# Patient Record
Sex: MALE | Race: ASIAN | URBAN - METROPOLITAN AREA
[De-identification: names, ages, dates, MRNs, and addresses within clinical notes are randomized per-mention and may not be internally consistent; named-entity substitution may affect disease eponyms.]

---

## 2020-01-13 PROBLEM — 710815001: Status: ACTIVE | Noted: 2020-01-13

## 2020-01-13 PROBLEM — 82934008: Status: ACTIVE | Noted: 2020-01-13

## 2020-01-13 PROBLEM — 110483000: Status: ACTIVE | Noted: 2020-01-13

## 2020-01-13 PROBLEM — 313436004: Status: ACTIVE | Noted: 2020-01-13

## 2020-01-13 PROBLEM — 80301007: Status: ACTIVE | Noted: 2020-01-13

## 2020-12-09 ENCOUNTER — OFFICE VISIT (OUTPATIENT)
Dept: URBAN - METROPOLITAN AREA CLINIC 37 | Facility: CLINIC | Age: 75
End: 2020-12-09

## 2023-09-13 PROBLEM — 128302006: Status: ACTIVE | Noted: 2023-09-13

## 2023-09-14 ENCOUNTER — OFFICE VISIT (OUTPATIENT)
Dept: URBAN - METROPOLITAN AREA CLINIC 37 | Facility: CLINIC | Age: 78
End: 2023-09-14

## 2023-09-14 RX ORDER — AMOXICILLIN 500 MG/1
1 CAPSULE CAPSULE ORAL BID
COMMUNITY
Start: 2020-01-08

## 2023-09-14 RX ORDER — CARBOXYMETHYLCELLULOSE SODIUM 5 MG/ML
AS DIRECTED SOLUTION/ DROPS OPHTHALMIC
Status: ACTIVE | COMMUNITY

## 2023-09-14 RX ORDER — SIMETHICONE 80 MG
1 TABLET AFTER MEALS AS NEEDED TABLET,CHEWABLE ORAL THREE TIMES A DAY
Qty: 90 TABLET | Refills: 0 | COMMUNITY
Start: 2020-01-13

## 2023-09-14 RX ORDER — METFORMIN HYDROCHLORIDE 500 MG/1
1 TABLET WITH A MEAL TABLET, FILM COATED ORAL ONCE A DAY
Qty: 30 | Status: ACTIVE | COMMUNITY

## 2023-09-14 RX ORDER — FINASTERIDE 5 MG/1
1 TABLET TABLET, FILM COATED ORAL ONCE A DAY
Qty: 30 | Status: ACTIVE | COMMUNITY

## 2023-09-14 RX ORDER — PLECANATIDE 3 MG/1
1 TABLET TABLET ORAL ONCE A DAY
Qty: 30 | Status: ACTIVE | COMMUNITY

## 2023-09-14 RX ORDER — B-COMPLEX WITH VITAMIN C
AS DIRECTED TABLET ORAL
Status: ACTIVE | COMMUNITY

## 2023-09-14 RX ORDER — DOCUSATE SODIUM 100 MG/1
1 CAPSULE AS NEEDED CAPSULE, GELATIN COATED ORAL ONCE A DAY
Qty: 30 | Status: ACTIVE | COMMUNITY

## 2023-09-14 RX ORDER — DOCUSATE SODIUM 100 MG/1
2 CAPSULES CAPSULE ORAL ONCE A DAY
Qty: 60 CAPSULE | Refills: 5 | COMMUNITY
Start: 2020-01-13

## 2023-09-14 RX ORDER — POLYETHYLENE GLYCOL 3350 17 G/17G
17 GM MIXED IN 10 OZ OF FLUID POWDER, FOR SOLUTION ORAL BID
Qty: 1 BOTTLE | Refills: 2 | COMMUNITY
Start: 2020-01-13

## 2023-09-14 RX ORDER — CIPROFLOXACIN HYDROCHLORIDE 500 MG/1
1 TABLET TABLET, FILM COATED ORAL TWICE A DAY
Status: ACTIVE | COMMUNITY

## 2023-09-14 NOTE — HPI-HEP C
Patient is here for a routine follow up due to history of Hepatitis C, GT 6, G2S2 on liver biopsy 07/10/2021. Last OV was 3 years ago Patient was referred back to me b/c recent labs doen 07/2023 showed elevated AFP
11-Sep-2019 00:19

## 2023-09-19 ENCOUNTER — OUT OF OFFICE VISIT (OUTPATIENT)
Dept: URBAN - METROPOLITAN AREA MEDICAL CENTER 24 | Facility: MEDICAL CENTER | Age: 78
End: 2023-09-19
Payer: COMMERCIAL

## 2023-09-19 DIAGNOSIS — R93.3 ABN FINDINGS-GI TRACT: ICD-10-CM

## 2023-09-19 DIAGNOSIS — R10.84 ABDOMINAL CRAMPING, GENERALIZED: ICD-10-CM

## 2023-09-19 PROCEDURE — 99222 1ST HOSP IP/OBS MODERATE 55: CPT | Performed by: PHYSICIAN ASSISTANT

## 2023-09-19 PROCEDURE — G8427 DOCREV CUR MEDS BY ELIG CLIN: HCPCS | Performed by: PHYSICIAN ASSISTANT

## 2023-09-20 ENCOUNTER — OUT OF OFFICE VISIT (OUTPATIENT)
Dept: URBAN - METROPOLITAN AREA MEDICAL CENTER 24 | Facility: MEDICAL CENTER | Age: 78
End: 2023-09-20
Payer: COMMERCIAL

## 2023-09-20 DIAGNOSIS — R93.3 ABN FINDINGS-GI TRACT: ICD-10-CM

## 2023-09-20 DIAGNOSIS — R10.84 ABDOMINAL CRAMPING, GENERALIZED: ICD-10-CM

## 2023-09-20 PROCEDURE — 99232 SBSQ HOSP IP/OBS MODERATE 35: CPT | Performed by: INTERNAL MEDICINE

## 2023-09-21 ENCOUNTER — OUT OF OFFICE VISIT (OUTPATIENT)
Dept: URBAN - METROPOLITAN AREA MEDICAL CENTER 24 | Facility: MEDICAL CENTER | Age: 78
End: 2023-09-21
Payer: COMMERCIAL

## 2023-09-21 ENCOUNTER — OUT OF OFFICE VISIT (OUTPATIENT)
Dept: URBAN - METROPOLITAN AREA MEDICAL CENTER 24 | Facility: MEDICAL CENTER | Age: 78
End: 2023-09-21

## 2023-09-21 DIAGNOSIS — K29.80 ACUTE DUODENITIS: ICD-10-CM

## 2023-09-21 DIAGNOSIS — K29.40 ATROPHIC GASTRITIS: ICD-10-CM

## 2023-09-21 DIAGNOSIS — D50.9 ANEMIA: ICD-10-CM

## 2023-09-21 DIAGNOSIS — K31.A11 GASTRIC INTESTINAL METAPLASIA WITHOUT DYSPLASIA, INVOLVING THE ANTRUM: ICD-10-CM

## 2023-09-21 PROCEDURE — 43239 EGD BIOPSY SINGLE/MULTIPLE: CPT | Performed by: INTERNAL MEDICINE

## 2023-09-22 ENCOUNTER — OUT OF OFFICE VISIT (OUTPATIENT)
Dept: URBAN - METROPOLITAN AREA MEDICAL CENTER 24 | Facility: MEDICAL CENTER | Age: 78
End: 2023-09-22
Payer: COMMERCIAL

## 2023-09-22 DIAGNOSIS — R93.3 ABN FINDINGS-GI TRACT: ICD-10-CM

## 2023-09-22 DIAGNOSIS — R10.84 ABDOMINAL CRAMPING, GENERALIZED: ICD-10-CM

## 2023-09-22 PROCEDURE — 99232 SBSQ HOSP IP/OBS MODERATE 35: CPT | Performed by: INTERNAL MEDICINE

## 2023-10-13 ENCOUNTER — TELEPHONE ENCOUNTER (OUTPATIENT)
Dept: URBAN - METROPOLITAN AREA CLINIC 35 | Facility: CLINIC | Age: 78
End: 2023-10-13

## 2023-10-15 PROBLEM — 442618008: Status: ACTIVE | Noted: 2023-10-15

## 2023-10-15 PROBLEM — 408574004: Status: ACTIVE | Noted: 2023-10-15

## 2023-10-15 PROBLEM — 428283002: Status: ACTIVE | Noted: 2020-01-13

## 2023-10-19 ENCOUNTER — LAB OUTSIDE AN ENCOUNTER (OUTPATIENT)
Dept: URBAN - METROPOLITAN AREA CLINIC 37 | Facility: CLINIC | Age: 78
End: 2023-10-19

## 2023-10-19 ENCOUNTER — OFFICE VISIT (OUTPATIENT)
Dept: URBAN - METROPOLITAN AREA CLINIC 37 | Facility: CLINIC | Age: 78
End: 2023-10-19
Payer: COMMERCIAL

## 2023-10-19 VITALS
HEIGHT: 64 IN | BODY MASS INDEX: 22.2 KG/M2 | SYSTOLIC BLOOD PRESSURE: 140 MMHG | DIASTOLIC BLOOD PRESSURE: 80 MMHG | WEIGHT: 130 LBS | OXYGEN SATURATION: 100 % | HEART RATE: 84 BPM

## 2023-10-19 DIAGNOSIS — Z86.010 PERSONAL HISTORY OF COLONIC POLYPS: ICD-10-CM

## 2023-10-19 DIAGNOSIS — K59.04 CHRONIC IDIOPATHIC CONSTIPATION: ICD-10-CM

## 2023-10-19 DIAGNOSIS — D62 ANEMIA DUE TO ACUTE BLOOD LOSS: ICD-10-CM

## 2023-10-19 DIAGNOSIS — K26.9 DUODENAL ULCER: ICD-10-CM

## 2023-10-19 DIAGNOSIS — R97.8 ELEVATED TUMOR MARKERS: ICD-10-CM

## 2023-10-19 DIAGNOSIS — R16.0 LIVER MASS: ICD-10-CM

## 2023-10-19 DIAGNOSIS — Z86.19 HISTORY OF HEPATITIS C: ICD-10-CM

## 2023-10-19 DIAGNOSIS — Z72.0 TOBACCO USE: ICD-10-CM

## 2023-10-19 PROBLEM — 267530009: Status: ACTIVE | Noted: 2023-10-19

## 2023-10-19 PROBLEM — 51868009: Status: ACTIVE | Noted: 2023-10-19

## 2023-10-19 PROBLEM — 300332007: Status: ACTIVE | Noted: 2023-10-19

## 2023-10-19 PROBLEM — 93871000119101: Status: ACTIVE | Noted: 2020-01-13

## 2023-10-19 PROCEDURE — 99214 OFFICE O/P EST MOD 30 MIN: CPT | Performed by: INTERNAL MEDICINE

## 2023-10-19 RX ORDER — HYDROCODONE BITARTRATE AND ACETAMINOPHEN 5; 325 MG/1; MG/1
1 TABLET AS NEEDED TABLET ORAL
Status: ACTIVE | COMMUNITY

## 2023-10-19 RX ORDER — B-COMPLEX WITH VITAMIN C
AS DIRECTED TABLET ORAL
Status: ON HOLD | COMMUNITY

## 2023-10-19 RX ORDER — SIMVASTATIN 20 MG
1 TABLET IN THE EVENING TABLET ORAL ONCE A DAY
Status: ACTIVE | COMMUNITY

## 2023-10-19 RX ORDER — PANTOPRAZOLE SODIUM 40 MG/1
1 TABLET TABLET, DELAYED RELEASE ORAL
Qty: 90 | Refills: 1 | OUTPATIENT
Start: 2023-10-19

## 2023-10-19 RX ORDER — FERROUS SULFATE TAB 325 MG (65 MG ELEMENTAL FE) 325 (65 FE) MG
1 TABLET TAB ORAL TWICE DAILY
Qty: 60 | Refills: 5 | OUTPATIENT
Start: 2023-10-19

## 2023-10-19 RX ORDER — DOCUSATE SODIUM 100 MG/1
1 CAPSULE AS NEEDED CAPSULE, GELATIN COATED ORAL ONCE A DAY
Qty: 30 | Status: ACTIVE | COMMUNITY

## 2023-10-19 RX ORDER — LINACLOTIDE 145 UG/1
1 CAPSULE AT LEAST 30 MINUTES BEFORE THE FIRST MEAL OF THE DAY ON AN EMPTY STOMACH CAPSULE, GELATIN COATED ORAL ONCE A DAY
Qty: 90 | Refills: 3 | OUTPATIENT
Start: 2023-10-19 | End: 2024-10-13

## 2023-10-19 RX ORDER — CARBOXYMETHYLCELLULOSE SODIUM 5 MG/ML
AS DIRECTED SOLUTION/ DROPS OPHTHALMIC
Status: ACTIVE | COMMUNITY

## 2023-10-19 RX ORDER — DOCUSATE SODIUM 100 MG/1
2 CAPSULES CAPSULE ORAL ONCE A DAY
Qty: 60 CAPSULE | Refills: 5 | COMMUNITY
Start: 2020-01-13

## 2023-10-19 RX ORDER — METFORMIN HYDROCHLORIDE 500 MG/1
1 TABLET WITH A MEAL TABLET, FILM COATED ORAL ONCE A DAY
Qty: 30 | Status: ACTIVE | COMMUNITY

## 2023-10-19 RX ORDER — PLECANATIDE 3 MG/1
1 TABLET TABLET ORAL ONCE A DAY
Qty: 30 | Status: ON HOLD | COMMUNITY

## 2023-10-19 RX ORDER — POLYETHYLENE GLYCOL 3350 17 G/17G
17 GM MIXED IN 10 OZ OF FLUID POWDER, FOR SOLUTION ORAL BID
Qty: 1 BOTTLE | Refills: 2 | COMMUNITY
Start: 2020-01-13

## 2023-10-19 RX ORDER — SODIUM SULFATE, POTASSIUM SULFATE, MAGNESIUM SULFATE 17.5; 3.13; 1.6 G/ML; G/ML; G/ML
AS DIRECTED SOLUTION, CONCENTRATE ORAL ONCE
Qty: 1 KIT | Refills: 0 | Status: ON HOLD | COMMUNITY
Start: 2020-01-13

## 2023-10-19 RX ORDER — FINASTERIDE 5 MG/1
1 TABLET TABLET, FILM COATED ORAL ONCE A DAY
Qty: 30 | Status: ON HOLD | COMMUNITY

## 2023-10-19 RX ORDER — LINACLOTIDE 145 UG/1
1 CAPSULE AT LEAST 30 MINUTES BEFORE THE FIRST MEAL OF THE DAY ON AN EMPTY STOMACH CAPSULE, GELATIN COATED ORAL ONCE A DAY
Status: ACTIVE | COMMUNITY

## 2023-10-19 RX ORDER — CIPROFLOXACIN HYDROCHLORIDE 500 MG/1
1 TABLET TABLET, FILM COATED ORAL TWICE A DAY
Status: ON HOLD | COMMUNITY

## 2023-10-19 RX ORDER — IBUPROFEN 600 MG/1
1 TABLET WITH FOOD OR MILK AS NEEDED TABLET, FILM COATED ORAL THREE TIMES A DAY
Status: ACTIVE | COMMUNITY

## 2023-10-19 RX ORDER — FERROUS SULFATE TAB 325 MG (65 MG ELEMENTAL FE) 325 (65 FE) MG
1 TABLET TAB ORAL
Status: ACTIVE | COMMUNITY

## 2023-10-19 RX ORDER — SUCRALFATE 1 G/1
1 TABLET ON AN EMPTY STOMACH TABLET ORAL
Qty: 60 | Refills: 1 | OUTPATIENT
Start: 2023-10-19 | End: 2023-12-17

## 2023-10-19 RX ORDER — PANTOPRAZOLE SODIUM 40 MG/1
1 TABLET TABLET, DELAYED RELEASE ORAL ONCE A DAY
Status: ACTIVE | COMMUNITY

## 2023-10-19 RX ORDER — AMOXICILLIN 500 MG/1
1 CAPSULE CAPSULE ORAL BID
Status: ACTIVE | COMMUNITY
Start: 2020-01-08

## 2023-10-19 RX ORDER — AMLODIPINE BESYLATE 2.5 MG/1
1 TABLET TABLET ORAL ONCE A DAY
Status: ACTIVE | COMMUNITY

## 2023-10-19 RX ORDER — SIMETHICONE 80 MG
1 TABLET AFTER MEALS AS NEEDED TABLET,CHEWABLE ORAL THREE TIMES A DAY
Qty: 90 TABLET | Refills: 0 | COMMUNITY
Start: 2020-01-13

## 2023-10-19 NOTE — HPI-ABNORMAL FINDINGS
This is 78 years-old [place] male known to the practice presents today due to abnormal finding on labs and imaging (MRI Abd).  Patient has a history of Hepatitis C, GT6. Patient was completed 48 weeks of PEG/RBV dual therapy ending in 08/2013.  Patient had a liver biopsy on 07/10/2012, which showed chronic active hepatitis with mild activity with grade 2 and stage 2 fibrosis.  Patient has been following up with JACK[facility] Carroll & Dr. Radha Alonso. On 07/07/2023, she had routine labs done which showed AFP: 2507.2 (H) He was referred to me and had an appointment on 09/14/23 but didn't show up for appointment Patient was seen by PCP and had an MRI Abd/pelvis done on 10/12/2023 that revealed a liver mass within the lateral aspect of the caudate measures 2.1 x 1.4 and restricted diffusion. The lesion appears new since 2020.  Enhancement characteristics cannot be assessed due to motion artifact, but malignancy remains a concerns given the presence of restricted diffusion. Small cystic focus in the pancreatic body, potentially sidebranch IPMN.

## 2023-10-19 NOTE — HPI-CONSTIPATION
Patient is here for an acute visit due to constipation Onset of symptoms: several years ago Description: 1 BM every 2-3 days, type 2 stool on BSC Associated symptoms: denies rectal bleeding, blood in stool. Patient reports that he has black stools, but was told by his PCP that it was due to his supplements  Medications tried: Linzess 145 mcg PRN

## 2023-10-20 LAB
A/G RATIO: 1.4
ALBUMIN: 4.2
ALKALINE PHOSPHATASE: 114
ALT (SGPT): 8
AST (SGOT): 17
BILIRUBIN, TOTAL: 0.3
BUN/CREATININE RATIO: (no result)
BUN: 11
CALCIUM: 9.8
CARBON DIOXIDE, TOTAL: 26
CHLORIDE: 104
CREATININE: 0.71
EGFR: 95
GLOBULIN, TOTAL: 3.1
GLUCOSE: 99
HEMATOCRIT: 39.3
HEMOGLOBIN: 12.5
INR: 1
IRON BIND.CAP.(TIBC): 322
IRON SATURATION: 8
IRON: 27
MCH: 29.8
MCHC: 31.8
MCV: 93.8
MPV: 10.1
PLATELET COUNT: 299
POTASSIUM: 4.4
PROTEIN, TOTAL: 7.3
PT: 10.7
RDW: 13
RED BLOOD CELL COUNT: 4.19
SODIUM: 138
WHITE BLOOD CELL COUNT: 5.6

## 2023-11-16 ENCOUNTER — LAB OUTSIDE AN ENCOUNTER (OUTPATIENT)
Dept: URBAN - METROPOLITAN AREA CLINIC 37 | Facility: CLINIC | Age: 78
End: 2023-11-16

## 2023-11-16 ENCOUNTER — OFFICE VISIT (OUTPATIENT)
Dept: URBAN - METROPOLITAN AREA CLINIC 37 | Facility: CLINIC | Age: 78
End: 2023-11-16
Payer: COMMERCIAL

## 2023-11-16 VITALS
OXYGEN SATURATION: 96 % | WEIGHT: 130 LBS | DIASTOLIC BLOOD PRESSURE: 62 MMHG | HEART RATE: 73 BPM | HEIGHT: 64 IN | SYSTOLIC BLOOD PRESSURE: 124 MMHG | BODY MASS INDEX: 22.2 KG/M2

## 2023-11-16 DIAGNOSIS — Z86.010 PERSONAL HISTORY OF COLONIC POLYPS: ICD-10-CM

## 2023-11-16 DIAGNOSIS — Z72.0 TOBACCO USE: ICD-10-CM

## 2023-11-16 DIAGNOSIS — R97.8 ELEVATED TUMOR MARKERS: ICD-10-CM

## 2023-11-16 DIAGNOSIS — D62 ANEMIA DUE TO ACUTE BLOOD LOSS: ICD-10-CM

## 2023-11-16 DIAGNOSIS — K26.9 DUODENAL ULCER: ICD-10-CM

## 2023-11-16 DIAGNOSIS — R16.0 LIVER MASS: ICD-10-CM

## 2023-11-16 DIAGNOSIS — K59.04 CHRONIC IDIOPATHIC CONSTIPATION: ICD-10-CM

## 2023-11-16 DIAGNOSIS — Z86.19 HISTORY OF HEPATITIS C: ICD-10-CM

## 2023-11-16 PROCEDURE — 99215 OFFICE O/P EST HI 40 MIN: CPT | Performed by: INTERNAL MEDICINE

## 2023-11-16 RX ORDER — PLECANATIDE 3 MG/1
1 TABLET TABLET ORAL ONCE A DAY
Qty: 30 | Status: ON HOLD | COMMUNITY

## 2023-11-16 RX ORDER — POLYETHYLENE GLYCOL 3350 17 G/17G
17 GM MIXED IN 10 OZ OF FLUID POWDER, FOR SOLUTION ORAL BID
Qty: 1 BOTTLE | Refills: 2 | Status: ON HOLD | COMMUNITY
Start: 2020-01-13

## 2023-11-16 RX ORDER — SUCRALFATE 1 G/1
1 TABLET ON AN EMPTY STOMACH TABLET ORAL
Qty: 60 | Refills: 1 | Status: ACTIVE | COMMUNITY
Start: 2023-10-19 | End: 2023-12-17

## 2023-11-16 RX ORDER — FERROUS SULFATE TAB 325 MG (65 MG ELEMENTAL FE) 325 (65 FE) MG
1 TABLET TAB ORAL TWICE DAILY
Qty: 60 | Refills: 5 | OUTPATIENT

## 2023-11-16 RX ORDER — DOCUSATE SODIUM 100 MG/1
2 CAPSULES CAPSULE ORAL ONCE A DAY
Qty: 60 CAPSULE | Refills: 5 | Status: ON HOLD | COMMUNITY
Start: 2020-01-13

## 2023-11-16 RX ORDER — METOPROLOL SUCCINATE 25 MG/1
1 TABLET TABLET, FILM COATED, EXTENDED RELEASE ORAL ONCE A DAY
Status: ACTIVE | COMMUNITY

## 2023-11-16 RX ORDER — CARBOXYMETHYLCELLULOSE SODIUM 5 MG/ML
AS DIRECTED SOLUTION/ DROPS OPHTHALMIC
Status: ACTIVE | COMMUNITY

## 2023-11-16 RX ORDER — LACTULOSE 10 G/15ML
15 ML SOLUTION ORAL
Qty: 900 MILLILITER | Refills: 3 | OUTPATIENT
Start: 2023-11-16 | End: 2024-03-15

## 2023-11-16 RX ORDER — AMLODIPINE BESYLATE 2.5 MG/1
1 TABLET TABLET ORAL ONCE A DAY
Status: ACTIVE | COMMUNITY

## 2023-11-16 RX ORDER — PANTOPRAZOLE SODIUM 40 MG/1
1 TABLET TABLET, DELAYED RELEASE ORAL
Qty: 90 | Refills: 1 | OUTPATIENT

## 2023-11-16 RX ORDER — LOSARTAN POTASSIUM 100 MG/1
1 TABLET TABLET ORAL ONCE A DAY
Status: ACTIVE | COMMUNITY

## 2023-11-16 RX ORDER — SIMETHICONE 80 MG
1 TABLET AFTER MEALS AS NEEDED TABLET,CHEWABLE ORAL THREE TIMES A DAY
Qty: 90 TABLET | Refills: 0 | Status: ON HOLD | COMMUNITY
Start: 2020-01-13

## 2023-11-16 RX ORDER — PANTOPRAZOLE SODIUM 40 MG/1
1 TABLET TABLET, DELAYED RELEASE ORAL ONCE A DAY
Status: ACTIVE | COMMUNITY

## 2023-11-16 RX ORDER — FERROUS SULFATE TAB 325 MG (65 MG ELEMENTAL FE) 325 (65 FE) MG
1 TABLET TAB ORAL
Status: ACTIVE | COMMUNITY

## 2023-11-16 RX ORDER — LINACLOTIDE 145 UG/1
1 CAPSULE AT LEAST 30 MINUTES BEFORE THE FIRST MEAL OF THE DAY ON AN EMPTY STOMACH CAPSULE, GELATIN COATED ORAL ONCE A DAY
Qty: 90 | Refills: 3 | OUTPATIENT

## 2023-11-16 RX ORDER — B-COMPLEX WITH VITAMIN C
AS DIRECTED TABLET ORAL
Status: ON HOLD | COMMUNITY

## 2023-11-16 RX ORDER — LINACLOTIDE 145 UG/1
1 CAPSULE AT LEAST 30 MINUTES BEFORE THE FIRST MEAL OF THE DAY ON AN EMPTY STOMACH CAPSULE, GELATIN COATED ORAL ONCE A DAY
Status: ACTIVE | COMMUNITY

## 2023-11-16 RX ORDER — SIMVASTATIN 20 MG
1 TABLET IN THE EVENING TABLET ORAL ONCE A DAY
Status: ACTIVE | COMMUNITY

## 2023-11-16 RX ORDER — METFORMIN HYDROCHLORIDE 500 MG/1
1 TABLET WITH A MEAL TABLET, FILM COATED ORAL ONCE A DAY
Qty: 30 | Status: ACTIVE | COMMUNITY

## 2023-11-16 RX ORDER — SUCRALFATE 1 G/1
1 TABLET ON AN EMPTY STOMACH TABLET ORAL
Qty: 60 | Refills: 1 | OUTPATIENT

## 2023-11-16 RX ORDER — FINASTERIDE 5 MG/1
1 TABLET TABLET, FILM COATED ORAL ONCE A DAY
Qty: 30 | Status: ON HOLD | COMMUNITY

## 2023-11-16 NOTE — HPI-CONSTIPATION
Patient is here for a routine follow up due to constipation Pstient initially experienced symptoms several years ago and reported having 1 BM every 2-3 days, type 2 stool on BSC. Patient is currently taking Linzess 145 mcg PRN, but patient reports no improvement in symptoms Patient reports that he has been using [employer] whenever he is severely constipated Current BM: 1 BM every 3-4 days, type 1 stool on BSC Patient denies any rectal bleeding, blood in stool. Patient reports seeing black stool

## 2023-12-12 ENCOUNTER — TELEPHONE ENCOUNTER (OUTPATIENT)
Dept: URBAN - METROPOLITAN AREA CLINIC 35 | Facility: CLINIC | Age: 78
End: 2023-12-12

## 2023-12-22 ENCOUNTER — OFFICE VISIT (OUTPATIENT)
Dept: URBAN - METROPOLITAN AREA SURGERY CENTER 8 | Facility: SURGERY CENTER | Age: 78
End: 2023-12-22

## 2024-01-03 ENCOUNTER — TELEPHONE ENCOUNTER (OUTPATIENT)
Dept: URBAN - METROPOLITAN AREA CLINIC 37 | Facility: CLINIC | Age: 79
End: 2024-01-03

## 2024-01-11 ENCOUNTER — OFFICE VISIT (OUTPATIENT)
Dept: URBAN - METROPOLITAN AREA CLINIC 37 | Facility: CLINIC | Age: 79
End: 2024-01-11

## 2024-01-17 ENCOUNTER — CLAIMS CREATED FROM THE CLAIM WINDOW (OUTPATIENT)
Dept: URBAN - METROPOLITAN AREA SURGERY CENTER 8 | Facility: SURGERY CENTER | Age: 79
End: 2024-01-17
Payer: COMMERCIAL

## 2024-01-17 ENCOUNTER — CLAIMS CREATED FROM THE CLAIM WINDOW (OUTPATIENT)
Dept: URBAN - METROPOLITAN AREA CLINIC 4 | Facility: CLINIC | Age: 79
End: 2024-01-17
Payer: COMMERCIAL

## 2024-01-17 DIAGNOSIS — K29.60 ADENOPAPILLOMATOSIS GASTRICA: ICD-10-CM

## 2024-01-17 DIAGNOSIS — K25.2 ACUTE GASTRIC ULCER: ICD-10-CM

## 2024-01-17 DIAGNOSIS — K21.9 GASTRO-ESOPHAGEAL REFLUX DISEASE WITHOUT ESOPHAGITIS: ICD-10-CM

## 2024-01-17 DIAGNOSIS — K31.89 ACHYLIA: ICD-10-CM

## 2024-01-17 DIAGNOSIS — R68.89 ERYTHEMATOUS MUCOSA: ICD-10-CM

## 2024-01-17 DIAGNOSIS — K21.9 ACID REFLUX: ICD-10-CM

## 2024-01-17 DIAGNOSIS — K29.80 DUODENITIS: ICD-10-CM

## 2024-01-17 DIAGNOSIS — K29.70 GASTRITIS, UNSPECIFIED, WITHOUT BLEEDING: ICD-10-CM

## 2024-01-17 PROCEDURE — 88342 IMHCHEM/IMCYTCHM 1ST ANTB: CPT | Performed by: PATHOLOGY

## 2024-01-17 PROCEDURE — 88305 TISSUE EXAM BY PATHOLOGIST: CPT | Performed by: PATHOLOGY

## 2024-01-17 PROCEDURE — 43239 EGD BIOPSY SINGLE/MULTIPLE: CPT | Performed by: INTERNAL MEDICINE

## 2024-01-17 PROCEDURE — 88313 SPECIAL STAINS GROUP 2: CPT | Performed by: PATHOLOGY

## 2024-01-17 PROCEDURE — G8907 PT DOC NO EVENTS ON DISCHARG: HCPCS | Performed by: INTERNAL MEDICINE

## 2024-01-17 PROCEDURE — 00731 ANES UPR GI NDSC PX NOS: CPT | Performed by: NURSE ANESTHETIST, CERTIFIED REGISTERED

## 2024-01-17 RX ORDER — PANTOPRAZOLE SODIUM 40 MG/1
1 TABLET TABLET, DELAYED RELEASE ORAL
Qty: 90 | Refills: 1 | Status: ACTIVE | COMMUNITY

## 2024-01-17 RX ORDER — PANTOPRAZOLE SODIUM 40 MG/1
1 TABLET TABLET, DELAYED RELEASE ORAL ONCE A DAY
Status: ACTIVE | COMMUNITY

## 2024-01-17 RX ORDER — DOCUSATE SODIUM 100 MG/1
2 CAPSULES CAPSULE ORAL ONCE A DAY
Qty: 60 CAPSULE | Refills: 5 | Status: ON HOLD | COMMUNITY
Start: 2020-01-13

## 2024-01-17 RX ORDER — POLYETHYLENE GLYCOL 3350 17 G/17G
17 GM MIXED IN 10 OZ OF FLUID POWDER, FOR SOLUTION ORAL BID
Qty: 1 BOTTLE | Refills: 2 | Status: ON HOLD | COMMUNITY
Start: 2020-01-13

## 2024-01-17 RX ORDER — CARBOXYMETHYLCELLULOSE SODIUM 5 MG/ML
AS DIRECTED SOLUTION/ DROPS OPHTHALMIC
Status: ACTIVE | COMMUNITY

## 2024-01-17 RX ORDER — SUCRALFATE 1 G/1
1 TABLET ON AN EMPTY STOMACH TABLET ORAL
Qty: 60 | Refills: 1 | Status: ACTIVE | COMMUNITY

## 2024-01-17 RX ORDER — METFORMIN HYDROCHLORIDE 500 MG/1
1 TABLET WITH A MEAL TABLET, FILM COATED ORAL ONCE A DAY
Qty: 30 | Status: ACTIVE | COMMUNITY

## 2024-01-17 RX ORDER — LACTULOSE 10 G/15ML
15 ML SOLUTION ORAL
Qty: 900 MILLILITER | Refills: 3 | Status: ACTIVE | COMMUNITY
Start: 2023-11-16 | End: 2024-03-15

## 2024-01-17 RX ORDER — LINACLOTIDE 145 UG/1
1 CAPSULE AT LEAST 30 MINUTES BEFORE THE FIRST MEAL OF THE DAY ON AN EMPTY STOMACH CAPSULE, GELATIN COATED ORAL ONCE A DAY
Qty: 90 | Refills: 3 | Status: ACTIVE | COMMUNITY

## 2024-01-17 RX ORDER — AMLODIPINE BESYLATE 2.5 MG/1
1 TABLET TABLET ORAL ONCE A DAY
Status: ACTIVE | COMMUNITY

## 2024-01-17 RX ORDER — METOPROLOL SUCCINATE 25 MG/1
1 TABLET TABLET, FILM COATED, EXTENDED RELEASE ORAL ONCE A DAY
Status: ACTIVE | COMMUNITY

## 2024-01-17 RX ORDER — LINACLOTIDE 145 UG/1
1 CAPSULE AT LEAST 30 MINUTES BEFORE THE FIRST MEAL OF THE DAY ON AN EMPTY STOMACH CAPSULE, GELATIN COATED ORAL ONCE A DAY
Status: ACTIVE | COMMUNITY

## 2024-01-17 RX ORDER — SIMVASTATIN 20 MG
1 TABLET IN THE EVENING TABLET ORAL ONCE A DAY
Status: ACTIVE | COMMUNITY

## 2024-01-17 RX ORDER — PLECANATIDE 3 MG/1
1 TABLET TABLET ORAL ONCE A DAY
Qty: 30 | Status: ON HOLD | COMMUNITY

## 2024-01-17 RX ORDER — FINASTERIDE 5 MG/1
1 TABLET TABLET, FILM COATED ORAL ONCE A DAY
Qty: 30 | Status: ON HOLD | COMMUNITY

## 2024-01-17 RX ORDER — SIMETHICONE 80 MG
1 TABLET AFTER MEALS AS NEEDED TABLET,CHEWABLE ORAL THREE TIMES A DAY
Qty: 90 TABLET | Refills: 0 | Status: ON HOLD | COMMUNITY
Start: 2020-01-13

## 2024-01-17 RX ORDER — B-COMPLEX WITH VITAMIN C
AS DIRECTED TABLET ORAL
Status: ON HOLD | COMMUNITY

## 2024-01-17 RX ORDER — LOSARTAN POTASSIUM 100 MG/1
1 TABLET TABLET ORAL ONCE A DAY
Status: ACTIVE | COMMUNITY

## 2024-01-17 RX ORDER — FERROUS SULFATE TAB 325 MG (65 MG ELEMENTAL FE) 325 (65 FE) MG
1 TABLET TAB ORAL
Status: ACTIVE | COMMUNITY

## 2024-01-17 RX ORDER — FERROUS SULFATE TAB 325 MG (65 MG ELEMENTAL FE) 325 (65 FE) MG
1 TABLET TAB ORAL TWICE DAILY
Qty: 60 | Refills: 5 | Status: ACTIVE | COMMUNITY

## 2024-02-08 ENCOUNTER — OV EP (OUTPATIENT)
Dept: URBAN - METROPOLITAN AREA CLINIC 37 | Facility: CLINIC | Age: 79
End: 2024-02-08
Payer: COMMERCIAL

## 2024-02-08 VITALS
OXYGEN SATURATION: 98 % | SYSTOLIC BLOOD PRESSURE: 144 MMHG | HEART RATE: 94 BPM | DIASTOLIC BLOOD PRESSURE: 72 MMHG | WEIGHT: 131 LBS | HEIGHT: 64 IN | BODY MASS INDEX: 22.36 KG/M2

## 2024-02-08 DIAGNOSIS — R16.0 LIVER MASS: ICD-10-CM

## 2024-02-08 DIAGNOSIS — K59.04 CHRONIC IDIOPATHIC CONSTIPATION: ICD-10-CM

## 2024-02-08 DIAGNOSIS — Z86.010 PERSONAL HISTORY OF COLONIC POLYPS: ICD-10-CM

## 2024-02-08 DIAGNOSIS — D62 ANEMIA DUE TO ACUTE BLOOD LOSS: ICD-10-CM

## 2024-02-08 DIAGNOSIS — K26.9 DUODENAL ULCER: ICD-10-CM

## 2024-02-08 DIAGNOSIS — R97.8 ELEVATED TUMOR MARKERS: ICD-10-CM

## 2024-02-08 DIAGNOSIS — Z72.0 TOBACCO USE: ICD-10-CM

## 2024-02-08 DIAGNOSIS — Z86.19 HISTORY OF HEPATITIS C: ICD-10-CM

## 2024-02-08 PROCEDURE — 99214 OFFICE O/P EST MOD 30 MIN: CPT | Performed by: INTERNAL MEDICINE

## 2024-02-08 RX ORDER — FERROUS SULFATE TAB 325 MG (65 MG ELEMENTAL FE) 325 (65 FE) MG
1 TABLET TAB ORAL
Status: ACTIVE | COMMUNITY

## 2024-02-08 RX ORDER — LINACLOTIDE 145 UG/1
1 CAPSULE AT LEAST 30 MINUTES BEFORE THE FIRST MEAL OF THE DAY ON AN EMPTY STOMACH CAPSULE, GELATIN COATED ORAL ONCE A DAY
Qty: 90 | Refills: 3 | Status: ACTIVE | COMMUNITY

## 2024-02-08 RX ORDER — DOCUSATE SODIUM 100 MG/1
2 CAPSULES CAPSULE ORAL ONCE A DAY
Qty: 60 CAPSULE | Refills: 5 | Status: ACTIVE | COMMUNITY
Start: 2020-01-13

## 2024-02-08 RX ORDER — METOPROLOL SUCCINATE 25 MG/1
1 TABLET TABLET, FILM COATED, EXTENDED RELEASE ORAL ONCE A DAY
Status: ACTIVE | COMMUNITY

## 2024-02-08 RX ORDER — PLECANATIDE 3 MG/1
1 TABLET TABLET ORAL ONCE A DAY
Qty: 30 | Status: ACTIVE | COMMUNITY

## 2024-02-08 RX ORDER — SIMVASTATIN 20 MG
1 TABLET IN THE EVENING TABLET ORAL ONCE A DAY
Status: ACTIVE | COMMUNITY

## 2024-02-08 RX ORDER — LOSARTAN POTASSIUM 100 MG/1
1 TABLET TABLET ORAL ONCE A DAY
Status: ACTIVE | COMMUNITY

## 2024-02-08 RX ORDER — PANTOPRAZOLE SODIUM 40 MG/1
1 TABLET TABLET, DELAYED RELEASE ORAL
Qty: 90 | Refills: 1 | Status: ACTIVE | COMMUNITY

## 2024-02-08 RX ORDER — LINACLOTIDE 145 UG/1
1 CAPSULE AT LEAST 30 MINUTES BEFORE THE FIRST MEAL OF THE DAY ON AN EMPTY STOMACH CAPSULE, GELATIN COATED ORAL ONCE A DAY
Qty: 90 | Refills: 3 | OUTPATIENT

## 2024-02-08 RX ORDER — CARBOXYMETHYLCELLULOSE SODIUM 5 MG/ML
AS DIRECTED SOLUTION/ DROPS OPHTHALMIC
Status: ACTIVE | COMMUNITY

## 2024-02-08 RX ORDER — SUCRALFATE 1 G/1
1 TABLET ON AN EMPTY STOMACH TABLET ORAL
Qty: 60 | Refills: 1 | Status: ACTIVE | COMMUNITY

## 2024-02-08 RX ORDER — LINACLOTIDE 145 UG/1
1 CAPSULE AT LEAST 30 MINUTES BEFORE THE FIRST MEAL OF THE DAY ON AN EMPTY STOMACH CAPSULE, GELATIN COATED ORAL ONCE A DAY
Status: ACTIVE | COMMUNITY

## 2024-02-08 RX ORDER — LACTULOSE 10 G/15ML
15 ML SOLUTION ORAL
Qty: 900 MILLILITER | Refills: 3 | Status: ACTIVE | COMMUNITY
Start: 2023-11-16 | End: 2024-03-15

## 2024-02-08 RX ORDER — FERROUS SULFATE TAB 325 MG (65 MG ELEMENTAL FE) 325 (65 FE) MG
1 TABLET TAB ORAL TWICE DAILY
Qty: 60 | Refills: 5 | Status: ACTIVE | COMMUNITY

## 2024-02-08 RX ORDER — AMLODIPINE BESYLATE 2.5 MG/1
1 TABLET TABLET ORAL ONCE A DAY
Status: ACTIVE | COMMUNITY

## 2024-02-08 RX ORDER — LACTULOSE 10 G/15ML
15 ML SOLUTION ORAL
Qty: 900 MILLILITER | Refills: 3 | OUTPATIENT

## 2024-02-08 RX ORDER — B-COMPLEX WITH VITAMIN C
AS DIRECTED TABLET ORAL
Status: ACTIVE | COMMUNITY

## 2024-02-08 RX ORDER — PANTOPRAZOLE SODIUM 40 MG/1
1 TABLET TABLET, DELAYED RELEASE ORAL
Qty: 90 | Refills: 1 | OUTPATIENT

## 2024-02-08 RX ORDER — POLYETHYLENE GLYCOL 3350 17 G/17G
17 GM MIXED IN 10 OZ OF FLUID POWDER, FOR SOLUTION ORAL BID
Qty: 1 BOTTLE | Refills: 2 | Status: ACTIVE | COMMUNITY
Start: 2020-01-13

## 2024-02-08 RX ORDER — METFORMIN HYDROCHLORIDE 500 MG/1
1 TABLET WITH A MEAL TABLET, FILM COATED ORAL ONCE A DAY
Qty: 30 | Status: ACTIVE | COMMUNITY

## 2024-02-08 RX ORDER — FERROUS SULFATE TAB 325 MG (65 MG ELEMENTAL FE) 325 (65 FE) MG
1 TABLET TAB ORAL TWICE DAILY
Qty: 60 | Refills: 5 | OUTPATIENT

## 2024-02-08 RX ORDER — PANTOPRAZOLE SODIUM 40 MG/1
1 TABLET TABLET, DELAYED RELEASE ORAL ONCE A DAY
Status: ACTIVE | COMMUNITY

## 2024-02-08 RX ORDER — FINASTERIDE 5 MG/1
1 TABLET TABLET, FILM COATED ORAL ONCE A DAY
Qty: 30 | Status: ACTIVE | COMMUNITY

## 2024-02-08 RX ORDER — SIMETHICONE 80 MG
1 TABLET AFTER MEALS AS NEEDED TABLET,CHEWABLE ORAL THREE TIMES A DAY
Qty: 90 TABLET | Refills: 0 | Status: ACTIVE | COMMUNITY
Start: 2020-01-13

## 2024-02-08 RX ORDER — SUCRALFATE 1 G/1
1 TABLET ON AN EMPTY STOMACH TABLET ORAL
Qty: 60 | Refills: 1 | OUTPATIENT

## 2024-02-08 NOTE — HPI-CONSTIPATION
Patient is here for a routine follow up due to constipation Last OV was 2 months ago Pstient initially experienced symptoms several years ago and reported having 1 BM every 2-3 days, type 2 stool on BSC. Therefore was prescribed Linzess 145 mcg PRN, but patient reports no improvement in symptoms Advised to add Lactulose 15 mL BID, but ran out of medication a while ago Current BM: 1 BM every 2 day, type 1 stool on BSC Patient denies any rectal bleeding, blood in stool or melena

## 2024-04-01 ENCOUNTER — LAB (OUTPATIENT)
Dept: URBAN - METROPOLITAN AREA CLINIC 37 | Facility: CLINIC | Age: 79
End: 2024-04-01

## 2024-04-11 ENCOUNTER — OV EP (OUTPATIENT)
Dept: URBAN - METROPOLITAN AREA CLINIC 37 | Facility: CLINIC | Age: 79
End: 2024-04-11

## 2025-03-04 ENCOUNTER — CLAIMS CREATED FROM THE CLAIM WINDOW (OUTPATIENT)
Dept: URBAN - METROPOLITAN AREA MEDICAL CENTER 24 | Facility: MEDICAL CENTER | Age: 80
End: 2025-03-04
Payer: COMMERCIAL

## 2025-03-04 DIAGNOSIS — K92.0 BLOODY EMESIS: ICD-10-CM

## 2025-03-04 DIAGNOSIS — Z79.01 ADEQUATE ANTICOAGULATION ON ANTICOAGULANT THERAPY: ICD-10-CM

## 2025-03-04 DIAGNOSIS — D62 ABLA (ACUTE BLOOD LOSS ANEMIA): ICD-10-CM

## 2025-03-04 DIAGNOSIS — R14.0 ABDOMINAL BLOATING: ICD-10-CM

## 2025-03-04 PROCEDURE — 99222 1ST HOSP IP/OBS MODERATE 55: CPT | Performed by: PHYSICIAN ASSISTANT

## 2025-03-04 PROCEDURE — G8427 DOCREV CUR MEDS BY ELIG CLIN: HCPCS | Performed by: PHYSICIAN ASSISTANT

## 2025-03-04 PROCEDURE — 99254 IP/OBS CNSLTJ NEW/EST MOD 60: CPT | Performed by: PHYSICIAN ASSISTANT

## 2025-03-05 ENCOUNTER — CLAIMS CREATED FROM THE CLAIM WINDOW (OUTPATIENT)
Dept: URBAN - METROPOLITAN AREA MEDICAL CENTER 24 | Facility: MEDICAL CENTER | Age: 80
End: 2025-03-05
Payer: COMMERCIAL

## 2025-03-05 DIAGNOSIS — K31.89 OTHER DISEASES OF STOMACH AND DUODENUM: ICD-10-CM

## 2025-03-05 DIAGNOSIS — R10.13 ABDOMINAL DISCOMFORT, EPIGASTRIC: ICD-10-CM

## 2025-03-05 DIAGNOSIS — K26.9 CHILDHOOD DUODENAL ULCER: ICD-10-CM

## 2025-03-05 PROCEDURE — 43235 EGD DIAGNOSTIC BRUSH WASH: CPT | Performed by: INTERNAL MEDICINE

## 2025-03-05 PROCEDURE — 43239 EGD BIOPSY SINGLE/MULTIPLE: CPT | Performed by: INTERNAL MEDICINE

## 2025-03-06 ENCOUNTER — CLAIMS CREATED FROM THE CLAIM WINDOW (OUTPATIENT)
Dept: URBAN - METROPOLITAN AREA MEDICAL CENTER 24 | Facility: MEDICAL CENTER | Age: 80
End: 2025-03-06
Payer: COMMERCIAL

## 2025-03-06 DIAGNOSIS — K92.0 BLOODY EMESIS: ICD-10-CM

## 2025-03-06 DIAGNOSIS — Z79.01 ADEQUATE ANTICOAGULATION ON ANTICOAGULANT THERAPY: ICD-10-CM

## 2025-03-06 DIAGNOSIS — K26.9 CHILDHOOD DUODENAL ULCER: ICD-10-CM

## 2025-03-06 DIAGNOSIS — D62 ABLA (ACUTE BLOOD LOSS ANEMIA): ICD-10-CM

## 2025-03-06 PROCEDURE — 99232 SBSQ HOSP IP/OBS MODERATE 35: CPT | Performed by: PHYSICIAN ASSISTANT

## 2025-03-13 ENCOUNTER — LAB OUTSIDE AN ENCOUNTER (OUTPATIENT)
Dept: URBAN - METROPOLITAN AREA CLINIC 37 | Facility: CLINIC | Age: 80
End: 2025-03-13

## 2025-03-13 ENCOUNTER — DASHBOARD ENCOUNTERS (OUTPATIENT)
Age: 80
End: 2025-03-13

## 2025-03-13 ENCOUNTER — OFFICE VISIT (OUTPATIENT)
Dept: URBAN - METROPOLITAN AREA CLINIC 37 | Facility: CLINIC | Age: 80
End: 2025-03-13
Payer: COMMERCIAL

## 2025-03-13 VITALS
SYSTOLIC BLOOD PRESSURE: 136 MMHG | HEART RATE: 83 BPM | HEIGHT: 64 IN | BODY MASS INDEX: 25.78 KG/M2 | DIASTOLIC BLOOD PRESSURE: 84 MMHG | WEIGHT: 151 LBS | OXYGEN SATURATION: 97 %

## 2025-03-13 DIAGNOSIS — C22.0 HEPATOCELLULAR CARCINOMA: ICD-10-CM

## 2025-03-13 DIAGNOSIS — K26.9 DUODENAL ULCER: ICD-10-CM

## 2025-03-13 DIAGNOSIS — Z72.0 TOBACCO USE: ICD-10-CM

## 2025-03-13 DIAGNOSIS — R97.8 ELEVATED TUMOR MARKERS: ICD-10-CM

## 2025-03-13 DIAGNOSIS — Z86.0101 PERSONAL HISTORY OF ADENOMATOUS AND SERRATED COLON POLYPS: ICD-10-CM

## 2025-03-13 DIAGNOSIS — K59.04 CHRONIC IDIOPATHIC CONSTIPATION: ICD-10-CM

## 2025-03-13 DIAGNOSIS — D62 ANEMIA DUE TO ACUTE BLOOD LOSS: ICD-10-CM

## 2025-03-13 DIAGNOSIS — Z86.19 HISTORY OF HEPATITIS C: ICD-10-CM

## 2025-03-13 PROBLEM — 109841003: Status: ACTIVE | Noted: 2025-03-13

## 2025-03-13 PROBLEM — 428283002: Status: ACTIVE | Noted: 2025-03-13

## 2025-03-13 PROCEDURE — 99214 OFFICE O/P EST MOD 30 MIN: CPT | Performed by: INTERNAL MEDICINE

## 2025-03-13 RX ORDER — FERROUS SULFATE 142(45)MG
1 TABLET TABLET, EXTENDED RELEASE ORAL DAILY
Qty: 30 TABLET | Refills: 3 | OUTPATIENT
Start: 2025-03-13

## 2025-03-13 RX ORDER — PANTOPRAZOLE SODIUM 40 MG/1
1 TABLET TABLET, DELAYED RELEASE ORAL
Qty: 90 | Refills: 1 | Status: ACTIVE | COMMUNITY

## 2025-03-13 RX ORDER — TRAMADOL HYDROCHLORIDE 50 MG/1
1 TABLET AS NEEDED TABLET, FILM COATED ORAL ONCE A DAY
Status: ACTIVE | COMMUNITY

## 2025-03-13 RX ORDER — LINACLOTIDE 145 UG/1
1 CAPSULE AT LEAST 30 MINUTES BEFORE THE FIRST MEAL OF THE DAY ON AN EMPTY STOMACH CAPSULE, GELATIN COATED ORAL ONCE A DAY
Status: ON HOLD | COMMUNITY

## 2025-03-13 RX ORDER — PRAVASTATIN SODIUM 40 MG/1
1 TABLET TABLET ORAL ONCE A DAY
Status: ACTIVE | COMMUNITY

## 2025-03-13 RX ORDER — SIMVASTATIN 20 MG
1 TABLET IN THE EVENING TABLET ORAL ONCE A DAY
Status: ON HOLD | COMMUNITY

## 2025-03-13 RX ORDER — SUCRALFATE 1 G/1
1 TABLET ON AN EMPTY STOMACH TABLET ORAL
Qty: 60 | Refills: 1 | Status: ACTIVE | COMMUNITY

## 2025-03-13 RX ORDER — LOSARTAN POTASSIUM 100 MG/1
1 TABLET TABLET ORAL ONCE A DAY
Status: ON HOLD | COMMUNITY

## 2025-03-13 RX ORDER — APIXABAN 5 MG/1
AS DIRECTED TABLET, FILM COATED ORAL
Status: ACTIVE | COMMUNITY

## 2025-03-13 RX ORDER — OMEPRAZOLE 40 MG/1
1 CAPSULE 1/2 TO 1 HOUR BEFORE MORNING MEAL CAPSULE, DELAYED RELEASE ORAL ONCE A DAY
Status: ON HOLD | COMMUNITY

## 2025-03-13 RX ORDER — PANTOPRAZOLE SODIUM 40 MG/1
1 TABLET BEFORE BREAKFAST AND DINNER TABLET, DELAYED RELEASE ORAL
Qty: 180 | Refills: 2 | OUTPATIENT

## 2025-03-13 RX ORDER — B-COMPLEX WITH VITAMIN C
AS DIRECTED TABLET ORAL
Status: ON HOLD | COMMUNITY

## 2025-03-13 RX ORDER — PANTOPRAZOLE SODIUM 40 MG/1
1 TABLET TABLET, DELAYED RELEASE ORAL ONCE A DAY
Status: ON HOLD | COMMUNITY

## 2025-03-13 RX ORDER — SODIUM, POTASSIUM,MAG SULFATES 17.5-3.13G
177 ML SOLUTION, RECONSTITUTED, ORAL ORAL
Qty: 354 MILLILITER | Refills: 0 | OUTPATIENT
Start: 2025-03-13 | End: 2025-03-15

## 2025-03-13 RX ORDER — POLYETHYLENE GLYCOL 3350 17 G/17G
17 GM MIXED IN 10 OZ OF FLUID POWDER, FOR SOLUTION ORAL BID
Qty: 1 BOTTLE | Refills: 2 | Status: ON HOLD | COMMUNITY
Start: 2020-01-13

## 2025-03-13 RX ORDER — HYDROXYZINE HYDROCHLORIDE 25 MG/1
1 TABLET AS NEEDED TABLET, FILM COATED ORAL ONCE A DAY
Status: ACTIVE | COMMUNITY

## 2025-03-13 RX ORDER — METOPROLOL SUCCINATE 25 MG/1
1 TABLET TABLET, FILM COATED, EXTENDED RELEASE ORAL ONCE A DAY
Status: ACTIVE | COMMUNITY

## 2025-03-13 RX ORDER — FERROUS SULFATE TAB 325 MG (65 MG ELEMENTAL FE) 325 (65 FE) MG
1 TABLET TAB ORAL TWICE DAILY
Qty: 60 | Refills: 5 | Status: ON HOLD | COMMUNITY

## 2025-03-13 RX ORDER — METFORMIN HYDROCHLORIDE 500 MG/1
1 TABLET WITH A MEAL TABLET, FILM COATED ORAL ONCE A DAY
Qty: 30 | Status: ON HOLD | COMMUNITY

## 2025-03-13 RX ORDER — LACTULOSE 10 G/15ML
15 ML SOLUTION ORAL
Qty: 900 MILLILITER | Refills: 3 | Status: ON HOLD | COMMUNITY

## 2025-03-13 RX ORDER — SIMETHICONE 80 MG
1 TABLET AFTER MEALS AS NEEDED TABLET,CHEWABLE ORAL THREE TIMES A DAY
Qty: 90 TABLET | Refills: 0 | Status: ON HOLD | COMMUNITY
Start: 2020-01-13

## 2025-03-13 RX ORDER — FERROUS SULFATE TAB 325 MG (65 MG ELEMENTAL FE) 325 (65 FE) MG
1 TABLET TAB ORAL
Status: ON HOLD | COMMUNITY

## 2025-03-13 RX ORDER — SUCRALFATE 1 G/1
1 TABLET ON AN EMPTY STOMACH TABLET ORAL
Qty: 60 | Refills: 2 | OUTPATIENT

## 2025-03-13 RX ORDER — LINACLOTIDE 145 UG/1
1 CAPSULE AT LEAST 30 MINUTES BEFORE THE FIRST MEAL OF THE DAY ON AN EMPTY STOMACH CAPSULE, GELATIN COATED ORAL ONCE A DAY
Qty: 90 | Refills: 3 | Status: ON HOLD | COMMUNITY

## 2025-03-13 RX ORDER — LINACLOTIDE 145 UG/1
1 CAPSULE AT LEAST 30 MINUTES BEFORE THE FIRST MEAL OF THE DAY ON AN EMPTY STOMACH CAPSULE, GELATIN COATED ORAL ONCE A DAY
Qty: 90 | Refills: 3 | OUTPATIENT

## 2025-03-13 RX ORDER — DOCUSATE SODIUM 100 MG/1
2 CAPSULES CAPSULE ORAL ONCE A DAY
Qty: 60 CAPSULE | Refills: 5 | Status: ON HOLD | COMMUNITY
Start: 2020-01-13

## 2025-03-13 RX ORDER — FINASTERIDE 5 MG/1
1 TABLET TABLET, FILM COATED ORAL ONCE A DAY
Qty: 30 | Status: ACTIVE | COMMUNITY

## 2025-03-13 RX ORDER — PLECANATIDE 3 MG/1
1 TABLET TABLET ORAL ONCE A DAY
Qty: 30 | Status: ON HOLD | COMMUNITY

## 2025-03-13 RX ORDER — CARBOXYMETHYLCELLULOSE SODIUM 5 MG/ML
AS DIRECTED SOLUTION/ DROPS OPHTHALMIC
Status: ON HOLD | COMMUNITY

## 2025-03-13 RX ORDER — AMLODIPINE BESYLATE 2.5 MG/1
1 TABLET TABLET ORAL ONCE A DAY
Status: ON HOLD | COMMUNITY

## 2025-03-13 NOTE — HPI-CONSTIPATION
Patient is here for a routine follow up due to constipation Last OV was 1 year ago Pstient initially experienced symptoms several years ago and reported having 1 BM every 2-3 days, type 2 stool on BSC. Therefore was prescribed Linzess 145 mcg PRN, but patient reports no improvement in symptoms Advised to add Lactulose 15 mL BID, but patient  Current BM: 1 BM every 2-3 days, type 2 stool on BSC Current symptoms: Patient reports having excessive gas and abdomial bloating  Patient denies any rectal bleeding, blood in stool or melena

## 2025-05-13 ENCOUNTER — LAB OUTSIDE AN ENCOUNTER (OUTPATIENT)
Dept: URBAN - METROPOLITAN AREA CLINIC 37 | Facility: CLINIC | Age: 80
End: 2025-05-13

## 2025-05-29 ENCOUNTER — CLAIMS CREATED FROM THE CLAIM WINDOW (OUTPATIENT)
Dept: URBAN - METROPOLITAN AREA CLINIC 4 | Facility: CLINIC | Age: 80
End: 2025-05-29
Payer: COMMERCIAL

## 2025-05-29 ENCOUNTER — TELEPHONE ENCOUNTER (OUTPATIENT)
Dept: URBAN - METROPOLITAN AREA CLINIC 35 | Facility: CLINIC | Age: 80
End: 2025-05-29

## 2025-05-29 ENCOUNTER — OFFICE VISIT (OUTPATIENT)
Dept: URBAN - METROPOLITAN AREA SURGERY CENTER 8 | Facility: SURGERY CENTER | Age: 80
End: 2025-05-29

## 2025-05-29 DIAGNOSIS — K63.5 POLYP OF COLON: ICD-10-CM

## 2025-05-29 DIAGNOSIS — D12.0 BENIGN NEOPLASM OF CECUM: ICD-10-CM

## 2025-05-29 DIAGNOSIS — K63.89 OTHER SPECIFIED DISEASES OF INTESTINE: ICD-10-CM

## 2025-05-29 DIAGNOSIS — K31.89 OTHER DISEASES OF STOMACH AND DUODENUM: ICD-10-CM

## 2025-05-29 PROCEDURE — 88305 TISSUE EXAM BY PATHOLOGIST: CPT | Performed by: PATHOLOGY

## 2025-05-29 PROCEDURE — 88312 SPECIAL STAINS GROUP 1: CPT | Performed by: PATHOLOGY

## 2025-05-29 RX ORDER — LINACLOTIDE 145 UG/1
1 CAPSULE AT LEAST 30 MINUTES BEFORE THE FIRST MEAL OF THE DAY ON AN EMPTY STOMACH CAPSULE, GELATIN COATED ORAL ONCE A DAY
Status: ON HOLD | COMMUNITY

## 2025-05-29 RX ORDER — PANTOPRAZOLE SODIUM 40 MG/1
1 TABLET BEFORE BREAKFAST AND DINNER TABLET, DELAYED RELEASE ORAL
Qty: 180 | Refills: 2 | Status: ACTIVE | COMMUNITY

## 2025-05-29 RX ORDER — TRAMADOL HYDROCHLORIDE 50 MG/1
1 TABLET AS NEEDED TABLET, FILM COATED ORAL ONCE A DAY
Status: ACTIVE | COMMUNITY

## 2025-05-29 RX ORDER — OMEPRAZOLE 40 MG/1
1 CAPSULE 1/2 TO 1 HOUR BEFORE MORNING MEAL CAPSULE, DELAYED RELEASE ORAL ONCE A DAY
Status: ON HOLD | COMMUNITY

## 2025-05-29 RX ORDER — PLECANATIDE 3 MG/1
1 TABLET TABLET ORAL ONCE A DAY
Qty: 30 | Status: ON HOLD | COMMUNITY

## 2025-05-29 RX ORDER — LOSARTAN POTASSIUM 100 MG/1
1 TABLET TABLET ORAL ONCE A DAY
Status: ON HOLD | COMMUNITY

## 2025-05-29 RX ORDER — METFORMIN HYDROCHLORIDE 500 MG/1
1 TABLET WITH A MEAL TABLET, FILM COATED ORAL ONCE A DAY
Qty: 30 | Status: ON HOLD | COMMUNITY

## 2025-05-29 RX ORDER — PANTOPRAZOLE SODIUM 40 MG/1
1 TABLET TABLET, DELAYED RELEASE ORAL ONCE A DAY
Status: ON HOLD | COMMUNITY

## 2025-05-29 RX ORDER — LACTULOSE 10 G/15ML
15 ML SOLUTION ORAL
Qty: 900 MILLILITER | Refills: 3 | Status: ON HOLD | COMMUNITY

## 2025-05-29 RX ORDER — APIXABAN 5 MG/1
AS DIRECTED TABLET, FILM COATED ORAL
Status: ACTIVE | COMMUNITY

## 2025-05-29 RX ORDER — PRAVASTATIN SODIUM 40 MG/1
1 TABLET TABLET ORAL ONCE A DAY
Status: ACTIVE | COMMUNITY

## 2025-05-29 RX ORDER — POLYETHYLENE GLYCOL 3350 17 G/17G
17 GM MIXED IN 10 OZ OF FLUID POWDER, FOR SOLUTION ORAL BID
Qty: 1 BOTTLE | Refills: 2 | Status: ON HOLD | COMMUNITY
Start: 2020-01-13

## 2025-05-29 RX ORDER — FERROUS SULFATE TAB 325 MG (65 MG ELEMENTAL FE) 325 (65 FE) MG
1 TABLET TAB ORAL
Status: ON HOLD | COMMUNITY

## 2025-05-29 RX ORDER — AMLODIPINE BESYLATE 2.5 MG/1
1 TABLET TABLET ORAL ONCE A DAY
Status: ON HOLD | COMMUNITY

## 2025-05-29 RX ORDER — FERROUS SULFATE 142(45)MG
1 TABLET TABLET, EXTENDED RELEASE ORAL DAILY
Qty: 30 TABLET | Refills: 3 | Status: ACTIVE | COMMUNITY
Start: 2025-03-13

## 2025-05-29 RX ORDER — B-COMPLEX WITH VITAMIN C
AS DIRECTED TABLET ORAL
Status: ON HOLD | COMMUNITY

## 2025-05-29 RX ORDER — FINASTERIDE 5 MG/1
1 TABLET TABLET, FILM COATED ORAL ONCE A DAY
Qty: 30 | Status: ACTIVE | COMMUNITY

## 2025-05-29 RX ORDER — SIMETHICONE 80 MG
1 TABLET AFTER MEALS AS NEEDED TABLET,CHEWABLE ORAL THREE TIMES A DAY
Qty: 90 TABLET | Refills: 0 | Status: ON HOLD | COMMUNITY
Start: 2020-01-13

## 2025-05-29 RX ORDER — LINACLOTIDE 145 UG/1
1 CAPSULE AT LEAST 30 MINUTES BEFORE THE FIRST MEAL OF THE DAY ON AN EMPTY STOMACH CAPSULE, GELATIN COATED ORAL ONCE A DAY
Qty: 90 | Refills: 3 | Status: ACTIVE | COMMUNITY

## 2025-05-29 RX ORDER — DOCUSATE SODIUM 100 MG/1
2 CAPSULES CAPSULE ORAL ONCE A DAY
Qty: 60 CAPSULE | Refills: 5 | Status: ON HOLD | COMMUNITY
Start: 2020-01-13

## 2025-05-29 RX ORDER — HYDROXYZINE HYDROCHLORIDE 25 MG/1
1 TABLET AS NEEDED TABLET, FILM COATED ORAL ONCE A DAY
Status: ACTIVE | COMMUNITY

## 2025-05-29 RX ORDER — FERROUS SULFATE TAB 325 MG (65 MG ELEMENTAL FE) 325 (65 FE) MG
1 TABLET TAB ORAL TWICE DAILY
Qty: 60 | Refills: 5 | Status: ON HOLD | COMMUNITY

## 2025-05-29 RX ORDER — SUCRALFATE 1 G/1
1 TABLET ON AN EMPTY STOMACH TABLET ORAL
Qty: 60 | Refills: 2

## 2025-05-29 RX ORDER — METOPROLOL SUCCINATE 25 MG/1
1 TABLET TABLET, FILM COATED, EXTENDED RELEASE ORAL ONCE A DAY
Status: ACTIVE | COMMUNITY

## 2025-05-29 RX ORDER — SIMVASTATIN 20 MG
1 TABLET IN THE EVENING TABLET ORAL ONCE A DAY
Status: ON HOLD | COMMUNITY

## 2025-05-29 RX ORDER — SUCRALFATE 1 G/1
1 TABLET ON AN EMPTY STOMACH TABLET ORAL
Qty: 60 | Refills: 2 | Status: ACTIVE | COMMUNITY

## 2025-05-29 RX ORDER — CARBOXYMETHYLCELLULOSE SODIUM 5 MG/ML
AS DIRECTED SOLUTION/ DROPS OPHTHALMIC
Status: ON HOLD | COMMUNITY

## 2025-05-30 ENCOUNTER — OFFICE VISIT (OUTPATIENT)
Dept: URBAN - METROPOLITAN AREA SURGERY CENTER 8 | Facility: SURGERY CENTER | Age: 80
End: 2025-05-30

## 2025-06-19 ENCOUNTER — OFFICE VISIT (OUTPATIENT)
Dept: URBAN - METROPOLITAN AREA CLINIC 37 | Facility: CLINIC | Age: 80
End: 2025-06-19

## 2025-07-24 ENCOUNTER — OFFICE VISIT (OUTPATIENT)
Dept: URBAN - METROPOLITAN AREA CLINIC 37 | Facility: CLINIC | Age: 80
End: 2025-07-24
Payer: COMMERCIAL

## 2025-07-24 DIAGNOSIS — C22.0 HEPATOCELLULAR CARCINOMA: ICD-10-CM

## 2025-07-24 DIAGNOSIS — K26.9 DUODENAL ULCER: ICD-10-CM

## 2025-07-24 DIAGNOSIS — K31.A0 GASTRIC INTESTINAL METAPLASIA, UNSPECIFIED: ICD-10-CM

## 2025-07-24 DIAGNOSIS — K29.80 ACUTE DUODENITIS: ICD-10-CM

## 2025-07-24 DIAGNOSIS — K63.5 HYPERPLASTIC POLYP OF SIGMOID COLON: ICD-10-CM

## 2025-07-24 DIAGNOSIS — D12.6 TUBULAR ADENOMA OF COLON: ICD-10-CM

## 2025-07-24 PROBLEM — 72519002: Status: ACTIVE | Noted: 2025-07-24

## 2025-07-24 PROBLEM — 715834004: Status: ACTIVE | Noted: 2025-07-24

## 2025-07-24 PROBLEM — 1344661002: Status: ACTIVE | Noted: 2025-07-24

## 2025-07-24 PROBLEM — 444898006: Status: ACTIVE | Noted: 2025-07-24

## 2025-07-24 PROBLEM — 698019000: Status: ACTIVE | Noted: 2025-07-24

## 2025-07-24 PROCEDURE — 99214 OFFICE O/P EST MOD 30 MIN: CPT | Performed by: INTERNAL MEDICINE

## 2025-07-24 RX ORDER — FINASTERIDE 5 MG/1
1 TABLET TABLET, FILM COATED ORAL ONCE A DAY
Qty: 30 TABLET | Status: ACTIVE | COMMUNITY

## 2025-07-24 RX ORDER — DOCUSATE SODIUM 100 MG/1
2 CAPSULES CAPSULE ORAL ONCE A DAY
Qty: 60 CAPSULE | Refills: 5 | Status: ON HOLD | COMMUNITY
Start: 2020-01-13

## 2025-07-24 RX ORDER — LINACLOTIDE 145 UG/1
1 CAPSULE AT LEAST 30 MINUTES BEFORE THE FIRST MEAL OF THE DAY ON AN EMPTY STOMACH CAPSULE, GELATIN COATED ORAL ONCE A DAY
Qty: 90 | Refills: 3 | Status: ACTIVE | COMMUNITY

## 2025-07-24 RX ORDER — PLECANATIDE 3 MG/1
1 TABLET TABLET ORAL ONCE A DAY
Qty: 30 | Status: ON HOLD | COMMUNITY

## 2025-07-24 RX ORDER — PANTOPRAZOLE SODIUM 40 MG/1
1 TABLET TABLET, DELAYED RELEASE ORAL ONCE A DAY
Status: ON HOLD | COMMUNITY

## 2025-07-24 RX ORDER — LOSARTAN POTASSIUM 100 MG/1
1 TABLET TABLET ORAL ONCE A DAY
Status: ON HOLD | COMMUNITY

## 2025-07-24 RX ORDER — LACTULOSE 10 G/15ML
15 ML SOLUTION ORAL
Qty: 900 MILLILITER | Refills: 3 | Status: ON HOLD | COMMUNITY

## 2025-07-24 RX ORDER — APIXABAN 5 MG/1
AS DIRECTED TABLET, FILM COATED ORAL TWICE A DAY
Status: ACTIVE | COMMUNITY

## 2025-07-24 RX ORDER — POLYETHYLENE GLYCOL 3350 17 G/17G
17 GM MIXED IN 10 OZ OF FLUID POWDER, FOR SOLUTION ORAL BID
Qty: 1 BOTTLE | Refills: 2 | Status: ON HOLD | COMMUNITY
Start: 2020-01-13

## 2025-07-24 RX ORDER — OMEPRAZOLE 40 MG/1
1 CAPSULE 1/2 TO 1 HOUR BEFORE MORNING MEAL CAPSULE, DELAYED RELEASE ORAL ONCE A DAY
Status: ON HOLD | COMMUNITY

## 2025-07-24 RX ORDER — TAMSULOSIN HYDROCHLORIDE 0.4 MG/1
1 CAPSULE CAPSULE ORAL ONCE A DAY
Status: ACTIVE | COMMUNITY

## 2025-07-24 RX ORDER — PRAVASTATIN SODIUM 40 MG/1
1 TABLET TABLET ORAL ONCE A DAY
Status: ACTIVE | COMMUNITY

## 2025-07-24 RX ORDER — TRAMADOL HYDROCHLORIDE 50 MG/1
1 TABLET AS NEEDED TABLET, FILM COATED ORAL ONCE A DAY
Status: ACTIVE | COMMUNITY

## 2025-07-24 RX ORDER — FERROUS SULFATE TAB 325 MG (65 MG ELEMENTAL FE) 325 (65 FE) MG
1 TABLET TAB ORAL
Status: ON HOLD | COMMUNITY

## 2025-07-24 RX ORDER — METFORMIN HYDROCHLORIDE 500 MG/1
1 TABLET WITH A MEAL TABLET, FILM COATED ORAL ONCE A DAY
Qty: 30 | Status: ON HOLD | COMMUNITY

## 2025-07-24 RX ORDER — LINACLOTIDE 145 UG/1
1 CAPSULE AT LEAST 30 MINUTES BEFORE THE FIRST MEAL OF THE DAY ON AN EMPTY STOMACH CAPSULE, GELATIN COATED ORAL ONCE A DAY
Status: ON HOLD | COMMUNITY

## 2025-07-24 RX ORDER — CARBOXYMETHYLCELLULOSE SODIUM 5 MG/ML
AS DIRECTED SOLUTION/ DROPS OPHTHALMIC
Status: ON HOLD | COMMUNITY

## 2025-07-24 RX ORDER — SIMVASTATIN 20 MG
1 TABLET IN THE EVENING TABLET ORAL ONCE A DAY
Status: ON HOLD | COMMUNITY

## 2025-07-24 RX ORDER — PANTOPRAZOLE SODIUM 40 MG/1
1 TABLET BEFORE BREAKFAST AND DINNER TABLET, DELAYED RELEASE ORAL
Qty: 180 | Refills: 2 | Status: ACTIVE | COMMUNITY

## 2025-07-24 RX ORDER — FERROUS SULFATE TAB 325 MG (65 MG ELEMENTAL FE) 325 (65 FE) MG
1 TABLET TAB ORAL TWICE DAILY
Qty: 60 | Refills: 5 | Status: ON HOLD | COMMUNITY

## 2025-07-24 RX ORDER — SUCRALFATE 1 G/1
1 TABLET ON AN EMPTY STOMACH TABLET ORAL
Qty: 60 | Refills: 2 | Status: ACTIVE | COMMUNITY

## 2025-07-24 RX ORDER — SIMETHICONE 80 MG
1 TABLET AFTER MEALS AS NEEDED TABLET,CHEWABLE ORAL THREE TIMES A DAY
Qty: 90 TABLET | Refills: 0 | Status: ON HOLD | COMMUNITY
Start: 2020-01-13

## 2025-07-24 RX ORDER — METOPROLOL SUCCINATE 25 MG/1
1 TABLET TABLET, FILM COATED, EXTENDED RELEASE ORAL ONCE A DAY
Status: ACTIVE | COMMUNITY

## 2025-07-24 RX ORDER — HYDROXYZINE HYDROCHLORIDE 25 MG/1
1 TABLET AS NEEDED TABLET, FILM COATED ORAL ONCE A DAY
Status: ACTIVE | COMMUNITY

## 2025-07-24 RX ORDER — AMLODIPINE BESYLATE 2.5 MG/1
1 TABLET TABLET ORAL ONCE A DAY
Status: ON HOLD | COMMUNITY

## 2025-07-24 RX ORDER — B-COMPLEX WITH VITAMIN C
AS DIRECTED TABLET ORAL
Status: ON HOLD | COMMUNITY

## 2025-07-24 RX ORDER — FERROUS SULFATE 142(45)MG
1 TABLET TABLET, EXTENDED RELEASE ORAL DAILY
Qty: 30 TABLET | Refills: 3 | Status: ACTIVE | COMMUNITY
Start: 2025-03-13